# Patient Record
(demographics unavailable — no encounter records)

---

## 2024-10-11 NOTE — ASSESSMENT
[FreeTextEntry1] : Pt is a 70 year old female reviewing CT abdomen results. Reviewed CT abdomen 9/24/2024 with pt. 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 Non obstructing left renal 0.4 calculus.  stable if not decreased size aml   F/U visit scheduled in 6 months

## 2024-10-11 NOTE — END OF VISIT
[FreeTextEntry4] : This note was written by Joseph Dalton on 10/08/2024 actively solely Aiden Fay M.D. I, Joseph Dalton, am scribing for and in the presence of Aiden Fay M.D. in the following sections HISTORY OF PRESENT ILLNESS, PAST MEDICAL/FAMILY/SOCIAL HISTORY; REVIEW OF SYSTEMS; VITAL SIGNS; PHYSICAL EXAM; ASSESSMENT/PLAN. All medical record entries made by this scribe at , Aiden Fay M.D. direction and personally dictated by me on 10/08/2024. I personally performed the services described in the documentation, reviewed the documentation recorded by the scribe in my presence, and it accurately and completely records my words and actions.  [Time Spent: ___ minutes] : I have spent [unfilled] minutes of time on the encounter which excludes teaching and separately reported services.

## 2024-10-11 NOTE — HISTORY OF PRESENT ILLNESS
[FreeTextEntry1] : 01/31/2023 cc f/ aml  Very pleasant 689year-old female who presents for evaluation of left renal lesion found on recent renal ultrasound. Patient reports that she was diagnosed with a renal lesion approximately 10 years ago and saw a urologist about this in the past. She reports that she was told that she did not need treatment for this at this time, but does not know what type of lesion was identified. She denies flank pain. No hematuria. No nausea or vomiting. No suprapubic pain. No difficulty voiding. 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 She has been taking steroids for this for many years.  IMPRESSION: A 2.9 x 2.5 x 3.3 cm echogenic lesion is present in the mid left kidney. The appearance is consistent with an angiomyolipoma. A smaller angiomyolipoma is seen at a similar location on the prior CT scan that was performed in 2009.  Please note that because of morbid obesity, this scan is suboptimal. Patient is currently experiencing no symptoms.  repeat ct shows similar appearing dayo renal density c/w aml's 2-3 mm nonobstructing left renal calc  repeat ct 12/22 [facility] unchanged  03/19/24 cc review CT images  70 year old female presents to review CT results. Pt reports dark colored urine that has persisted for the past 2-3 weeks. She denies having any pain. Pt also reports sometimes feeling as if the left side of her lower back is hard.  06/12/23 CT Impressions: * No colonic abnormality identified. * Bilateral renal angiomyolipomas, mildly increased in size on the left compared to 8/7/2019. * Supraumbilical abdominal wall hernia containing a segment of unobstructed small bowel.  03/13/24 Repeat CT images reviewed no report yet but appears unchanged  10/08/2024 cc: Review  Pt is a 70 year old female presenting for a f/u visit to review 9/24/2024 CT abdomen results. 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 Non obstructing left renal 0.4 calculus. Ventral abdominal hernia containing loop of small bowel with slightly prominent proximal bowel, partial obstruction is not excluded, oral contrast is seen progressing to the distal small bowel beyond this hernia sight. Posterior right hepatic 1.4 cm low attenuating nodule, more conspicuous than on prior, not well delineated on delayed phase favoring hermangioma however indeterminate. MR abdomen with and without IV contrast evaluation recommended. Left lower lobe 0.6 cm pulmonary nodule, stable. Completion CT chest follow up recommended in a high-risk patient. Pt reports losing 20 pounds recently, in prep of weight loss surgery. Pt was on Ozempic but stopped in august out of fear of side effects, felt constipation. Pt reports that she was 385 lbs, but now is 365 lb. Pt reports being 350 lbs in july, right before stoppping her Ozempic.  PMHx: spleen removal 1989